# Patient Record
Sex: MALE | NOT HISPANIC OR LATINO | Employment: FULL TIME | URBAN - METROPOLITAN AREA
[De-identification: names, ages, dates, MRNs, and addresses within clinical notes are randomized per-mention and may not be internally consistent; named-entity substitution may affect disease eponyms.]

---

## 2023-09-11 ENCOUNTER — HOSPITAL ENCOUNTER (OUTPATIENT)
Dept: DATA CONVERSION | Facility: HOSPITAL | Age: 63
Discharge: HOME | End: 2023-09-11
Payer: COMMERCIAL

## 2023-09-11 DIAGNOSIS — Z88.0 ALLERGY STATUS TO PENICILLIN: ICD-10-CM

## 2023-09-11 DIAGNOSIS — M25.512 PAIN IN LEFT SHOULDER: ICD-10-CM

## 2023-09-11 DIAGNOSIS — S49.92XA UNSPECIFIED INJURY OF LEFT SHOULDER AND UPPER ARM, INITIAL ENCOUNTER: ICD-10-CM
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
## 2024-05-29 ENCOUNTER — OFFICE VISIT (OUTPATIENT)
Dept: PRIMARY CARE | Facility: CLINIC | Age: 64
End: 2024-05-29
Payer: COMMERCIAL

## 2024-05-29 VITALS
HEIGHT: 67 IN | BODY MASS INDEX: 18.15 KG/M2 | OXYGEN SATURATION: 97 % | WEIGHT: 115.6 LBS | TEMPERATURE: 97.3 F | DIASTOLIC BLOOD PRESSURE: 82 MMHG | SYSTOLIC BLOOD PRESSURE: 130 MMHG | HEART RATE: 91 BPM

## 2024-05-29 DIAGNOSIS — M54.12 CERVICAL RADICULOPATHY: ICD-10-CM

## 2024-05-29 DIAGNOSIS — Z13.0 SCREENING FOR DEFICIENCY ANEMIA: ICD-10-CM

## 2024-05-29 DIAGNOSIS — R22.0 MASS OF LEFT SUBMANDIBULAR REGION: Primary | ICD-10-CM

## 2024-05-29 DIAGNOSIS — Z13.228 SCREENING FOR METABOLIC DISORDER: ICD-10-CM

## 2024-05-29 DIAGNOSIS — R20.2 ARM PARESTHESIA, LEFT: ICD-10-CM

## 2024-05-29 PROCEDURE — 4004F PT TOBACCO SCREEN RCVD TLK: CPT | Performed by: FAMILY MEDICINE

## 2024-05-29 PROCEDURE — 99204 OFFICE O/P NEW MOD 45 MIN: CPT | Performed by: FAMILY MEDICINE

## 2024-05-29 RX ORDER — LANOLIN ALCOHOL/MO/W.PET/CERES
1000 CREAM (GRAM) TOPICAL
COMMUNITY

## 2024-05-29 RX ORDER — CHOLECALCIFEROL (VITAMIN D3) 50 MCG
TABLET ORAL
COMMUNITY

## 2024-05-29 RX ORDER — THIAMINE HCL 50 MG
50 TABLET ORAL DAILY
COMMUNITY

## 2024-05-29 ASSESSMENT — PATIENT HEALTH QUESTIONNAIRE - PHQ9
1. LITTLE INTEREST OR PLEASURE IN DOING THINGS: NOT AT ALL
SUM OF ALL RESPONSES TO PHQ9 QUESTIONS 1 AND 2: 0
2. FEELING DOWN, DEPRESSED OR HOPELESS: NOT AT ALL

## 2024-05-29 ASSESSMENT — PAIN SCALES - GENERAL: PAINLEVEL: 0-NO PAIN

## 2024-05-29 NOTE — ASSESSMENT & PLAN NOTE
- Previous CT imaging reviewed with palpable mass on physical  -Will continue with previously recommended MRI; please schedule at earliest convenience  -Will additionally coordinate for formal ENT consultation

## 2024-05-29 NOTE — PATIENT INSTRUCTIONS
Problem List Items Addressed This Visit    None      Counseling:       Medication education:         Education:  The patient is counseled regarding potential side-effects of all new medications        Understanding:  Patient expressed understanding        Adherence:  No barriers to adherence identified    ** Please excuse any errors in grammar or translation related to this dictation. Voice recognition software was utilized to prepare this document. **

## 2024-05-29 NOTE — ASSESSMENT & PLAN NOTE
- Will utilize MRI to check for any nerve impingement as well as blood work to see if there are any contributing deficiencies or metabolic abnormalities

## 2024-05-29 NOTE — PROGRESS NOTES
Outpatient Visit Note    Chief Complaint   Patient presents with    New Patient Visit     Lump on left side of throat that was noted on a CT scan in Dec. Pt unsure how long lump has been present. He thought it was his tonsil but it is only present on the left side. Radiology referred pt to see a PCP.         HPI:  Grabiel Duval is a 64 y.o. male who presents to the office as a new patient    Review of chart notes that patient was evaluated at [facility] emergency department in December 2023 following a fall.  Chart notes that patient had fallen the night prior onto a concrete floor due to alcohol intoxication.  He sustained contusions and swelling of his right face wrist.  Imaging was completed with patient started on IV antibiotics and IV Keppra neurosurgery and ophthalmology were consulted as patient sustained a right frontal parenchymal contusion with right frontal subdural hematoma without mass effect.  Patient also had fractures including the roof of the right orbit with extension into the right frontal bone, with right orbital floor and lateral wall of the right maxillary sinus and right zygoma maxillary arch.  Patient additionally had closed compaction distal radius and ulnar styloid fractures.  In interval, patient has been extensively followed by [facility] Orth[facility] surgery and OT.      During imaging, CT of cervical spine did appreciate a cystic lesion/mass in the left submandibular region.  Further evaluation with MRI was recommended but patient has not had any follow-up in regards to this issue since.  He does continue to have a palpable, nontender lump with no reported swallowing difficulties.  Does express interest in having follow through on this.  Separately, he does report that over the course last week he has had recurrent episodes of paresthesias in his left upper extremity.  Denies any injury or activity change prior to symptom onset.    States to be otherwise in good health other than recurrent  aggravation in his right wrist status post injury/fracture to which he continues to work with physical therapy.    Current Medications  Current Outpatient Medications   Medication Instructions    ASCORBIC ACID, VITAMIN C, ORAL oral    CALCIUM-MAGNESIUM-ZINC ORAL oral    cholecalciferol (Vitamin D3) 50 MCG (2000 UT) tablet oral    cyanocobalamin (VITAMIN B-12) 1,000 mcg, oral, Daily RT    thiamine (VITAMIN B-1) 50 mg, oral, Daily        Allergies  No Known Allergies     History reviewed. No pertinent past medical history.   History reviewed. No pertinent surgical history.  Family History   Problem Relation Name Age of Onset    Cancer Father      Liver cancer Paternal Grandfather       Social History     Tobacco Use    Smoking status: Every Day     Current packs/day: 0.25     Types: Cigarettes    Smokeless tobacco: Never   Vaping Use    Vaping status: Never Used   Substance Use Topics    Alcohol use: Yes     Alcohol/week: 1.0 - 2.0 standard drink of alcohol     Types: 1 - 2 Glasses of wine per week     Comment: every other day    Drug use: Never       ROS  All pertinent positive symptoms are included in the history of present illness.  All other systems have been reviewed and are negative and noncontributory to this patient's current ailments.    VITAL SIGNS  Vitals:    05/29/24 1322   BP: 130/82   Pulse: 91   Temp: 36.3 °C (97.3 °F)   SpO2: 97%       PHYSICAL EXAM  GENERAL APPEARANCE: alert and oriented, Pleasant and cooperative, No Acute Distress  HEENT: EOMI, PERRLA, MMM  NECK: Palpable left nontender submandibular nodule; negative Spurling's test  HEART: RRR, normal S1S2, no murmurs, click or rubs  LUNGS: clear to auscultation bilaterally, no wheezes/rhonchi/rales  EXTREMITIES: no edema, normal ROM  SKIN: normal, no rash, unremarkable  NEUROLOGIC EXAM: non-focal exam  MUSCULOSKELETAL: no gross abnormalities  PSYCH: affect is normal, eye contact is good    Assessment/Plan   Problem List Items Addressed This Visit              ICD-10-CM    Mass of left submandibular region - Primary R22.0     - Previous CT imaging reviewed with palpable mass on physical  -Will continue with previously recommended MRI; please schedule at earliest convenience  -Will additionally coordinate for formal ENT consultation         Relevant Orders    MR cervical spine w and wo IV contrast    Referral to ENT    Cervical radiculopathy M54.12     - Will utilize MRI to check for any nerve impingement as well as blood work to see if there are any contributing deficiencies or metabolic abnormalities         Relevant Orders    MR cervical spine w and wo IV contrast     Other Visit Diagnoses         Codes    Arm paresthesia, left     R20.2    Relevant Orders    TSH with reflex to Free T4 if abnormal    Comprehensive Metabolic Panel    Screening for deficiency anemia     Z13.0    Relevant Orders    CBC    Screening for metabolic disorder     Z13.228    Relevant Orders    TSH with reflex to Free T4 if abnormal    Comprehensive Metabolic Panel            Counseling:       Medication education:         Education:  The patient is counseled regarding potential side-effects of all new medications        Understanding:  Patient expressed understanding        Adherence:  No barriers to adherence identified    ** Please excuse any errors in grammar or translation related to this dictation. Voice recognition software was utilized to prepare this document. **

## 2024-05-31 ENCOUNTER — TELEPHONE (OUTPATIENT)
Dept: PRIMARY CARE | Facility: CLINIC | Age: 64
End: 2024-05-31
Payer: COMMERCIAL

## 2024-05-31 DIAGNOSIS — R22.0 MASS OF LEFT SUBMANDIBULAR REGION: Primary | ICD-10-CM

## 2024-05-31 NOTE — TELEPHONE ENCOUNTER
Pt is scheduled to have a procedure on June 8th , pre cert was denied. In need of peer to peer to try to overturn. [facility] will be emailing the peer to peer form that pcp will need to complete and fax back.

## 2024-05-31 NOTE — LETTER
"May 31, 2024     Grabiel Duval  [address] [place] [address]    Patient: Grabiel Duval   YOB: 1960   Date of Visit: 5/31/2024       To whom it may concern,    My hope is that this letter will reach the appropriate parties as I did attempt to call and schedule a \"peer to peer\" just to be silently disconnected with any individual as they \"scheduled\" the requirement to justify why this order was made.  Please see the attached documentation.  It is clear that when insurance initially denied this they focused on the secondary diagnosis of cervical radiculopathy citing inadequate therapeutic options attempted prior to securing MRI request.  The primary request for the MRI was the submandibular mass which was identified via CT scan at the [facility] in December 2021 at which time radiology directly recommended follow-up imaging with MRI.  Please see report attached. at this time, patient currently has images scheduled to be completed on 6/6/2024.    I kindly ask that the oreilly that be properly evaluate the situation.  In the meantime, I will have the patient different getting imaging and will place a ENT referral with hopes that they may be able to satisfy the criteria that you have sent to determine how we provide care to our patients      Regards,    Zackery Li D.O.    "

## 2024-05-31 NOTE — TELEPHONE ENCOUNTER
Attempted to call and schedule though was disconnected.  Department stated that I would have to schedule peer to peer with the soonest available being Tuesday which is only 48 hours before patient schedule appointment.  They do not seem to be operating off of the correct information as a site the patient did not have trial of anti-inflammatory or physical therapy prior to MRI request.  Primary reason was to assess submandibular mass as ordered though they seem to only emphasize on the additional diagnosis of cervical radiculopathy.  I do not feel that I will be able to appropriately accommodate their wishes to justify why this individual should get this imaging in time for his current scheduled appointment.  I would have him defer so he does not incur significant cost.  In the meantime I will place a ENT referral with hopes that this will allow for proper management if his insurance is willing to cooperate in the future

## 2024-06-08 ENCOUNTER — APPOINTMENT (OUTPATIENT)
Dept: RADIOLOGY | Facility: HOSPITAL | Age: 64
End: 2024-06-08
Payer: COMMERCIAL

## 2024-06-12 ENCOUNTER — TELEPHONE (OUTPATIENT)
Dept: PRIMARY CARE | Facility: CLINIC | Age: 64
End: 2024-06-12
Payer: COMMERCIAL

## 2024-06-12 DIAGNOSIS — R22.0 MASS OF LEFT SUBMANDIBULAR REGION: Primary | ICD-10-CM

## 2024-06-12 NOTE — TELEPHONE ENCOUNTER
Grabiel called as he received a letter from [facility] that his MRI is being denied, as the reason being he needs to complete PT prior to having MRI done. Pt was under the impression that he did not need to complete PT in order to having the MRI done. Please advise and call back thank you.

## 2024-06-13 NOTE — TELEPHONE ENCOUNTER
Please refer to previous message on 5/31/2024.  I did attempt to communicate with insurance as they were denying his MRI.  Part of this was because I included his neck issues as an additional reason for completing imaging.  With this, they have denied the MRI viewing it as a musculoskeletal complaints which they are requesting physical therapy assessment.  I did have paperwork sent reiterating the previous imaging completed at ER including CT recommending MRI for soft tissue mass.  At this time, our best route is likely to link up with ear nose and throat specialist who can confirm that MRI is the most appropriate imaging type and likely get this approved via their specialty status

## 2024-06-20 ENCOUNTER — APPOINTMENT (OUTPATIENT)
Dept: OTOLARYNGOLOGY | Facility: HOSPITAL | Age: 64
End: 2024-06-20
Payer: COMMERCIAL

## 2024-07-02 ENCOUNTER — APPOINTMENT (OUTPATIENT)
Dept: OTOLARYNGOLOGY | Facility: CLINIC | Age: 64
End: 2024-07-02
Payer: COMMERCIAL

## 2024-07-22 ASSESSMENT — ENCOUNTER SYMPTOMS
NEAR-SYNCOPE: 0
LIGHT-HEADEDNESS: 0
WEAKNESS: 0
VERTIGO: 0
SLURRED SPEECH: 0
DIAPHORESIS: 0
MEMORY LOSS: 0
LOSS OF BALANCE: 0
PALPITATIONS: 0
ALTERED MENTAL STATUS: 0
NAUSEA: 0
FEVER: 0
BACK PAIN: 0
FOCAL WEAKNESS: 0
VISUAL CHANGE: 0
CONFUSION: 0
AURA: 0
ABDOMINAL PAIN: 0
FOCAL SENSORY LOSS: 1
HEADACHES: 0
BOWEL INCONTINENCE: 0
FATIGUE: 0
CLUMSINESS: 0
NECK PAIN: 0
SHORTNESS OF BREATH: 0
DIZZINESS: 0
VOMITING: 0
NEUROLOGIC COMPLAINT: 1

## 2024-07-23 ENCOUNTER — APPOINTMENT (OUTPATIENT)
Dept: OTOLARYNGOLOGY | Facility: CLINIC | Age: 64
End: 2024-07-23
Payer: COMMERCIAL

## 2024-07-23 VITALS — BODY MASS INDEX: 17.99 KG/M2 | HEIGHT: 67 IN | WEIGHT: 114.6 LBS | TEMPERATURE: 98.6 F

## 2024-07-23 DIAGNOSIS — R22.0 MASS OF LEFT SUBMANDIBULAR REGION: ICD-10-CM

## 2024-07-23 PROCEDURE — 3008F BODY MASS INDEX DOCD: CPT | Performed by: OTOLARYNGOLOGY

## 2024-07-23 PROCEDURE — 99204 OFFICE O/P NEW MOD 45 MIN: CPT | Performed by: OTOLARYNGOLOGY

## 2024-07-23 ASSESSMENT — PATIENT HEALTH QUESTIONNAIRE - PHQ9
SUM OF ALL RESPONSES TO PHQ9 QUESTIONS 1 AND 2: 0
2. FEELING DOWN, DEPRESSED OR HOPELESS: NOT AT ALL
1. LITTLE INTEREST OR PLEASURE IN DOING THINGS: NOT AT ALL

## 2024-07-23 NOTE — PROGRESS NOTES
CC: lump in neck    Consulted by: dr hardy    HPI:  noticed left sided neck mass    Felt it on his own since he was 20 in the [employer]    Didn't pay any attention    Thought it was his tonsil    Doesn't bother him        Past medical history:no HTN, no DM    Past surgical history: right pink finger 2004/set    Social history:  stopped 2.5 weeks (+ smoking prior for 35 yrs r< 1 ppd) , occasional  drinking    Family history:  Reviewed and not relevant to the presenting complaint    Current medications:      Reviewed as noted in current orders     Allergies:                               Reviewed and as noted in current orders    ROS:  All other systems have been reviewed and are negative for complaint. I personally reviewed the intake form that was scanned in today    PE:  CONSTITUTIONAL:  Vitals  -reviewed from intake field, well developed, well nourished.    VOICE:    RESPIRATION:  Breathing comfortably, no stridor.  CV:  No clubbing/cyanosis/edema in hands.  EYES:  EOM Intact, sclera normal.  NEURO:  Alert and oriented times 3, Cranial nerves II-XII intact and symmetric bilaterally.  HEAD AND FACE:  Symmetric facial features,  no masses or lesions, sinuses nontender to palpation.  SALIVARY GLANDS:  Parotid and submandibular glands normal on right, left SMG fullness  EARS:  Normal external ears, external auditory canals, and TMs to otoscopy, normal hearing to whispered voice.  NOSE:  External nose midline, anterior rhinoscopy is normal with limited visualization to the anterior aspect of the inferior turbinates.  No lesions noted.    ORAL CAVITY/OROPHARYNX/LIPS:  Normal mucous membranes, normal floor of mouth/tongue/OP, no masses or lesions are noted.  PHARYNGEAL WALLS AND NASOPHARYNX:  No masses noted. Mucosa appears clean and moist  NECK/LYMPH:  No LAD, no thyroid masses. Trachea palpably midline  SKIN:  Neck skin is without scar or injury  PSYCH:  Alert and oriented with appropriate mood and affect      A/P:     left neck fullness    MRI to best evaluate the soft tissues of the region given the location      Differential diagnosis reviewed  50% malignancy statistically in this region although given the duration makes this less likely    However there are also those that have malignancy transformations

## 2024-08-06 ENCOUNTER — HOSPITAL ENCOUNTER (OUTPATIENT)
Dept: RADIOLOGY | Facility: HOSPITAL | Age: 64
Discharge: HOME | End: 2024-08-06
Payer: COMMERCIAL

## 2024-08-06 DIAGNOSIS — R22.0 MASS OF LEFT SUBMANDIBULAR REGION: ICD-10-CM

## 2024-08-06 PROCEDURE — A9575 INJ GADOTERATE MEGLUMI 0.1ML: HCPCS | Performed by: OTOLARYNGOLOGY

## 2024-08-06 PROCEDURE — 2550000001 HC RX 255 CONTRASTS: Performed by: OTOLARYNGOLOGY

## 2024-08-06 PROCEDURE — 70543 MRI ORBT/FAC/NCK W/O &W/DYE: CPT | Performed by: RADIOLOGY

## 2024-08-06 PROCEDURE — 70543 MRI ORBT/FAC/NCK W/O &W/DYE: CPT

## 2024-08-06 RX ORDER — GADOTERATE MEGLUMINE 376.9 MG/ML
11 INJECTION INTRAVENOUS
Status: COMPLETED | OUTPATIENT
Start: 2024-08-06 | End: 2024-08-06

## 2024-08-13 ENCOUNTER — TELEPHONE (OUTPATIENT)
Dept: OTOLARYNGOLOGY | Facility: HOSPITAL | Age: 64
End: 2024-08-13
Payer: COMMERCIAL

## 2024-08-13 NOTE — TELEPHONE ENCOUNTER
Called patient to discuss MRI results.  Voicemail not activated.  Unable to leave message.    Areli please reach him for an appointment or for a time for he and I did speak    Would need to bring him back to the office for a needle aspiration   Happy to talk to him about it

## 2024-08-20 ENCOUNTER — APPOINTMENT (OUTPATIENT)
Dept: OTOLARYNGOLOGY | Facility: CLINIC | Age: 64
End: 2024-08-20
Payer: COMMERCIAL

## 2024-08-20 VITALS — HEIGHT: 67 IN | WEIGHT: 113.5 LBS | TEMPERATURE: 96.7 F | BODY MASS INDEX: 17.81 KG/M2

## 2024-08-20 DIAGNOSIS — R22.0 MASS OF LEFT SUBMANDIBULAR REGION: ICD-10-CM

## 2024-08-20 PROCEDURE — 88305 TISSUE EXAM BY PATHOLOGIST: CPT

## 2024-08-20 PROCEDURE — 99214 OFFICE O/P EST MOD 30 MIN: CPT | Performed by: OTOLARYNGOLOGY

## 2024-08-20 PROCEDURE — 88173 CYTOPATH EVAL FNA REPORT: CPT

## 2024-08-20 PROCEDURE — 10021 FNA BX W/O IMG GDN 1ST LES: CPT | Performed by: OTOLARYNGOLOGY

## 2024-08-20 RX ORDER — MULTIVITAMIN/IRON/FOLIC ACID 18MG-0.4MG
1 TABLET ORAL DAILY
COMMUNITY

## 2024-08-20 RX ORDER — LIDOCAINE HYDROCHLORIDE AND EPINEPHRINE 10; 10 MG/ML; UG/ML
3 INJECTION, SOLUTION INFILTRATION; PERINEURAL ONCE
Status: COMPLETED | OUTPATIENT
Start: 2024-08-20 | End: 2024-08-20

## 2024-08-20 ASSESSMENT — PATIENT HEALTH QUESTIONNAIRE - PHQ9
2. FEELING DOWN, DEPRESSED OR HOPELESS: NOT AT ALL
1. LITTLE INTEREST OR PLEASURE IN DOING THINGS: NOT AT ALL
SUM OF ALL RESPONSES TO PHQ9 QUESTIONS 1 AND 2: 0

## 2024-08-20 NOTE — PROGRESS NOTES
Patient here for follow-up on his longstanding left neck mass that perhaps has begun to grow recently    There is no pain    We reviewed the MRI directly on the PACS system showing what appears to be a well-circumscribed enhancing mass in the left submandibular region    There is some irregularity at one of the edges and some central lucency    Overall has not picture consistent with a benign process but given the recent change he understands there may be a consideration for malignant degeneration which happens in a small percentage of these over time        After discussion he elected to move forward with needle aspiration      Physical Exam:  CONSTITUTIONAL:  No acute distress  VOICE:  No hoarseness or other abnormality  RESPIRATION:  Breathing comfortably, no stridor  CV:  No clubbing/cyanosis/edema in hands  EYES:  EOM intact, sclera normal  NEURO:  Alert and oriented times 3, Cranial nerves II-XII grossly intact and symmetric bilaterally  HEAD AND FACE:  Symmetric facial features, no masses or lesions, sinuses non-tender to palpation  SALIVARY GLANDS:  Parotid and submandibular glands normal bilaterally except left submandibular gland which has a 3 cm mobile mass  EARS:  Normal external ears, external auditory canals, and TMs to otoscopy, normal hearing to whispered voice.  NOSE:  External nose midline, anterior rhinoscopy is normal with limited visualization to the anterior aspect of the interior turbinates, no bleeding or drainage, no lesions  ORAL CAVITY/OROPHARYNX/LIPS:  Normal mucous membranes, normal floor of mouth/tongue/OP, no masses or lesions  PHARYNGEAL WALLS:  No masses or lesions  NECK/LYMPH:  No LAD, no thyroid masses, trachea midline  SKIN:  Neck skin is without scar or injury  PSYCH:  Alert and oriented with appropriate mood and affect        At informed verbal consent the lesion and mass in question was appropriately identified cleansed and subcutaneous anesthetized 1% lidocaine with one  100,000 units of epinephrine and subsequent fine-needle aspiration utilizing 22-gauge needle was performed under sterile condition ×3 with slides, CytoLyt appropriate being obtained created labeled and sent to pathology for analysis pressure was applied for hemostasis area was dressed wound care instructions provided patient tolerated procedure well         Imp left SMG mass      Have talked about the differential diagnosis and the malignant potential of these mass either now or in the future      Detailed discussion regarding surgical management has been discussed    He wishes to proceed and we will work on the details as far as time off work etc.      Risk benefits and alternatives and lengthy discussion was held with patient regarding removal of the submandibular gland including surgical incisions, bleeding infection, risks to the marginal mandibular nerve, lingual nerve as well as hypoglossal nerve and the various implications of each.    Expectations from reduction and saliva and potential transient and in some cases long-term reduction saliva reviewed with them.        Transorally versus transcervical versus hybrid excisions have been reviewed    Verbal consent has been obtained

## 2024-08-21 LAB
LABORATORY COMMENT REPORT: NORMAL
LABORATORY COMMENT REPORT: NORMAL
PATH REPORT.COMMENTS IMP SPEC: NORMAL
PATH REPORT.FINAL DX SPEC: NORMAL
PATH REPORT.GROSS SPEC: NORMAL
PATH REPORT.RELEVANT HX SPEC: NORMAL
PATH REPORT.TOTAL CANCER: NORMAL
RESIDENT REVIEW: NORMAL

## 2024-08-22 ENCOUNTER — TELEPHONE (OUTPATIENT)
Dept: OTOLARYNGOLOGY | Facility: HOSPITAL | Age: 64
End: 2024-08-22
Payer: COMMERCIAL

## 2024-09-05 ENCOUNTER — TELEPHONE (OUTPATIENT)
Dept: OTOLARYNGOLOGY | Facility: HOSPITAL | Age: 64
End: 2024-09-05
Payer: COMMERCIAL

## 2024-09-05 NOTE — TELEPHONE ENCOUNTER
Spoke with patient about his biopsy report    Discussed the concept of pleomorphic adenoma and the potential for future malignant transformation    He would like to have it removed but would like to wait until after the first of the year  I will see him in December and we will plan for surgery  In early 2025

## 2024-12-03 ENCOUNTER — APPOINTMENT (OUTPATIENT)
Dept: OTOLARYNGOLOGY | Facility: CLINIC | Age: 64
End: 2024-12-03
Payer: COMMERCIAL

## 2024-12-03 VITALS — BODY MASS INDEX: 18.52 KG/M2 | TEMPERATURE: 98.2 F | WEIGHT: 118 LBS | HEIGHT: 67 IN

## 2024-12-03 DIAGNOSIS — R22.0 MASS OF LEFT SUBMANDIBULAR REGION: Primary | ICD-10-CM

## 2024-12-03 PROCEDURE — 99214 OFFICE O/P EST MOD 30 MIN: CPT | Performed by: OTOLARYNGOLOGY

## 2024-12-03 PROCEDURE — 3008F BODY MASS INDEX DOCD: CPT | Performed by: OTOLARYNGOLOGY

## 2024-12-05 NOTE — PROGRESS NOTES
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    Planning for removal in early 2025 per his request secondary to work constraints    This has been a longstanding mass    He understands the concept of potential malignant degeneration in the future and increased complexity with growth and increased size or with malignant change      Physical Exam:  CONSTITUTIONAL:  No acute distress  VOICE:  No hoarseness or other abnormality  RESPIRATION:  Breathing comfortably, no stridor  CV:  No clubbing/cyanosis/edema in hands  EYES:  EOM intact, sclera normal  NEURO:  Alert and oriented times 3, Cranial nerves II-XII grossly intact and symmetric bilaterally with the exception of left submandibular gland which has a firm mobile mass  HEAD AND FACE:  Symmetric facial features, no masses or lesions, sinuses non-tender to palpation  SALIVARY GLANDS:  Parotid and submandibular glands normal bilaterally  EARS:  Normal external ears, external auditory canals, and TMs to otoscopy, normal hearing to whispered voice.  NOSE:  External nose midline, anterior rhinoscopy is normal with limited visualization to the anterior aspect of the interior turbinates, no bleeding or drainage, no lesions  ORAL CAVITY/OROPHARYNX/LIPS:  Normal mucous membranes, normal floor of mouth/tongue/OP, no masses or lesions  PHARYNGEAL WALLS:  No masses or lesions  NECK/LYMPH:  No LAD, no thyroid masses, trachea midline  SKIN:  Neck skin is without scar or injury  PSYCH:  Alert and oriented with appropriate mood and affect  Scans been reviewed showing mass in the left submandibular region        Plans are for excision    Verbal consent has been obtained        Risk benefits and alternatives and lengthy discussion was held with patient regarding removal of the submandibular gland including surgical incisions, bleeding infection, risks to the marginal mandibular nerve, lingual nerve as well as  hypoglossal nerve and the various implications of each.    Expectations from reduction and saliva and potential transient and in some cases long-term reduction saliva reviewed with them.    Other options including observation  and the pros and cons of each approach were reviewed.        Verbal consent has been obtained

## 2024-12-06 DIAGNOSIS — R22.1 NECK MASS: ICD-10-CM

## 2024-12-06 NOTE — PROGRESS NOTES
Spoke to patient and he would like to proceed with surgery on 1/23. Surgery information emailed/mailed to patient

## 2025-01-07 ENCOUNTER — CLINICAL SUPPORT (OUTPATIENT)
Dept: PREADMISSION TESTING | Facility: HOSPITAL | Age: 65
End: 2025-01-07
Payer: COMMERCIAL

## 2025-01-07 DIAGNOSIS — R22.1 NECK MASS: ICD-10-CM

## 2025-01-07 RX ORDER — ZINC GLUCONATE 50 MG
TABLET ORAL DAILY
COMMUNITY
End: 2025-01-14 | Stop reason: WASHOUT

## 2025-01-07 NOTE — CPM/PAT NURSE NOTE
CPM/PAT Nurse Note      Name: Grabiel Duval (Grabiel Duval)  /Age: 1960/64 y.o.       No past medical history on file.    No past surgical history on file.    Patient  has no history on file for sexual activity.    Family History   Problem Relation Name Age of Onset    Cancer Father      Liver cancer Paternal Grandfather         No Known Allergies    Prior to Admission medications    Medication Sig Start Date End Date Taking? Authorizing Provider   ASCORBIC ACID, VITAMIN C, ORAL Take by mouth.   Yes Historical Provider, MD   b complex 0.4 mg tablet Take 1 tablet by mouth once daily.   Yes Historical Provider, MD   CALCIUM-MAGNESIUM-ZINC ORAL Take by mouth.   Yes Historical Provider, MD   cholecalciferol (Vitamin D3) 50 MCG (2000 UT) tablet Take by mouth.   Yes Historical Provider, MD   cyanocobalamin (Vitamin B-12) 1,000 mcg tablet Take 1 tablet (1,000 mcg) by mouth once daily.   Yes Historical Provider, MD   thiamine (Vitamin B-1) 50 mg tablet Take 1 tablet (50 mg) by mouth once daily.   Yes Historical Provider, MD   B complex-vitamin C-folic acid (Nephro-Delia Rx) 1- mg-mg-mcg tablet Take 1 tablet by mouth once daily with breakfast.    Historical Provider, MD   cetirizine HCl (ALLERGY RELIEF, CETIRIZINE, ORAL) Take by mouth if needed.    Historical Provider, MD   GINSENG ORAL Take by mouth if needed.    Historical Provider, MD   POTASSIUM CHLORIDE, BULK, MISC if needed.    Historical Provider, MD   TURMERIC ORAL Take by mouth if needed.    Historical Provider, MD   zinc gluconate 50 mg tablet Take by mouth once daily.    Historical Provider, MD DIETZ ROS     DASI Risk Score    No data to display       Caprini DVT Assessment    No data to display       Modified Frailty Index    No data to display       CHADS2 Stroke Risk  Current as of 3 days ago        N/A 3 to 100%: High Risk   2 to < 3%: Medium Risk   0 to < 2%: Low Risk     Last Change: N/A          This score determines the patient's risk  of having a stroke if the patient has atrial fibrillation.        This score is not applicable to this patient. Components are not calculated.          Revised Cardiac Risk Index    No data to display       Apfel Simplified Score    No data to display       Risk Analysis Index Results This Encounter    No data found in the last 10 encounters.       Prodigy: High Risk  Total Score: 16              Prodigy Age Score      Prodigy Gender Score          ARISCAT Score for Postoperative Pulmonary Complications    No data to display       Mcnally Perioperative Risk for Myocardial Infarction or Cardiac Arrest (MACI)    No data to display         Nurse Plan of Action:       RN screening call complete.  Reviewed allergies, medications and pharmacy.

## 2025-01-14 ENCOUNTER — PRE-ADMISSION TESTING (OUTPATIENT)
Dept: PREADMISSION TESTING | Facility: HOSPITAL | Age: 65
End: 2025-01-14
Payer: COMMERCIAL

## 2025-01-14 ENCOUNTER — DOCUMENTATION (OUTPATIENT)
Dept: PREADMISSION TESTING | Facility: HOSPITAL | Age: 65
End: 2025-01-14

## 2025-01-14 ENCOUNTER — LAB (OUTPATIENT)
Dept: LAB | Facility: LAB | Age: 65
End: 2025-01-14
Payer: COMMERCIAL

## 2025-01-14 VITALS
RESPIRATION RATE: 18 BRPM | BODY MASS INDEX: 18 KG/M2 | DIASTOLIC BLOOD PRESSURE: 85 MMHG | OXYGEN SATURATION: 99 % | HEIGHT: 65 IN | WEIGHT: 108.03 LBS | TEMPERATURE: 98.5 F | SYSTOLIC BLOOD PRESSURE: 147 MMHG | HEART RATE: 91 BPM

## 2025-01-14 DIAGNOSIS — Z01.818 PREOP TESTING: ICD-10-CM

## 2025-01-14 DIAGNOSIS — Z01.818 PREOP TESTING: Primary | ICD-10-CM

## 2025-01-14 LAB
ANION GAP SERPL CALC-SCNC: 14 MMOL/L (ref 10–20)
BASOPHILS # BLD AUTO: 0.02 X10*3/UL (ref 0–0.1)
BASOPHILS NFR BLD AUTO: 0.4 %
BUN SERPL-MCNC: 7 MG/DL (ref 6–23)
CALCIUM SERPL-MCNC: 10.9 MG/DL (ref 8.6–10.3)
CHLORIDE SERPL-SCNC: 103 MMOL/L (ref 98–107)
CO2 SERPL-SCNC: 27 MMOL/L (ref 21–32)
CREAT SERPL-MCNC: 0.84 MG/DL (ref 0.5–1.3)
EGFRCR SERPLBLD CKD-EPI 2021: >90 ML/MIN/1.73M*2
EOSINOPHIL # BLD AUTO: 0.02 X10*3/UL (ref 0–0.7)
EOSINOPHIL NFR BLD AUTO: 0.4 %
ERYTHROCYTE [DISTWIDTH] IN BLOOD BY AUTOMATED COUNT: 12.6 % (ref 11.5–14.5)
GLUCOSE SERPL-MCNC: 86 MG/DL (ref 74–99)
HCT VFR BLD AUTO: 39.5 % (ref 41–52)
HGB BLD-MCNC: 13 G/DL (ref 13.5–17.5)
IMM GRANULOCYTES # BLD AUTO: 0.03 X10*3/UL (ref 0–0.7)
IMM GRANULOCYTES NFR BLD AUTO: 0.7 % (ref 0–0.9)
LYMPHOCYTES # BLD AUTO: 0.67 X10*3/UL (ref 1.2–4.8)
LYMPHOCYTES NFR BLD AUTO: 14.8 %
MCH RBC QN AUTO: 32 PG (ref 26–34)
MCHC RBC AUTO-ENTMCNC: 32.9 G/DL (ref 32–36)
MCV RBC AUTO: 97 FL (ref 80–100)
MONOCYTES # BLD AUTO: 0.68 X10*3/UL (ref 0.1–1)
MONOCYTES NFR BLD AUTO: 15 %
NEUTROPHILS # BLD AUTO: 3.11 X10*3/UL (ref 1.2–7.7)
NEUTROPHILS NFR BLD AUTO: 68.7 %
NRBC BLD-RTO: 0 /100 WBCS (ref 0–0)
PLATELET # BLD AUTO: 123 X10*3/UL (ref 150–450)
POTASSIUM SERPL-SCNC: 4.3 MMOL/L (ref 3.5–5.3)
RBC # BLD AUTO: 4.06 X10*6/UL (ref 4.5–5.9)
SODIUM SERPL-SCNC: 140 MMOL/L (ref 136–145)
WBC # BLD AUTO: 4.5 X10*3/UL (ref 4.4–11.3)

## 2025-01-14 PROCEDURE — 99204 OFFICE O/P NEW MOD 45 MIN: CPT | Performed by: NURSE PRACTITIONER

## 2025-01-14 PROCEDURE — 80048 BASIC METABOLIC PNL TOTAL CA: CPT

## 2025-01-14 PROCEDURE — 85025 COMPLETE CBC W/AUTO DIFF WBC: CPT

## 2025-01-14 ASSESSMENT — ENCOUNTER SYMPTOMS
NECK NEGATIVE: 1
ENDOCRINE NEGATIVE: 1
NEUROLOGICAL NEGATIVE: 1
DIFFICULTY URINATING: 0
RESPIRATORY NEGATIVE: 1
CARDIOVASCULAR NEGATIVE: 1
GASTROINTESTINAL NEGATIVE: 1
CONSTITUTIONAL NEGATIVE: 1
MUSCULOSKELETAL NEGATIVE: 1

## 2025-01-14 NOTE — H&P (VIEW-ONLY)
[facility]/PAT Evaluation       Name: Grabiel Duval (Grabiel Duval)  /Age: 1960/64 y.o.     In-Person         Date of Consult: 25    Referring Provider:  Dr. Ruiz    Date, Surgery, and Length:  25, left salivary gland excision, 120 minutes    Patient presents to [facility] for perioperative risk assessment prior to scheduled surgery. Patient presents with pleomorphic adenoma of left submandibular gland. He would like to proceed with removal.      This note was created in part upon personal review of patient's medical records.      Pt denies any past history of anesthetic complications such as PONV, awareness, prolonged sedation, dental damage, aspiration, cardiac arrest, difficult intubation, difficult I.V. access or unexpected hospital admissions. No history of malignant hyperthermia and or pseudocholinesterase deficiency.    No history of blood transfusions.    The patient IS NOT a [employer] and will accept blood and blood products if medically indicated.     Type and screen NOT sent.      Past Medical History:   Diagnosis Date    Cervical radiculopathy     Mass of left submandibular region     Right orbital fracture (Multi)         Right wrist fracture          No past surgical history on file.    Family History   Problem Relation Name Age of Onset    Cancer Father      Liver cancer Paternal Grandfather       Social History     Tobacco Use   Smoking Status Every Day    Current packs/day: 0.50    Types: Cigarettes    Passive exposure: Never   Smokeless Tobacco Never     Quit smoking 3 weeks ago     Social History     Substance and Sexual Activity   Alcohol Use Yes    Alcohol/week: 1.0 - 2.0 standard drink of alcohol    Types: 1 - 2 Glasses of wine per week    Comment: every other day     Social History     Substance and Sexual Activity   Drug Use Never       No Known Allergies    Current Outpatient Medications   Medication Instructions    ASCORBIC ACID, VITAMIN C, ORAL Take by  "mouth.    b complex 0.4 mg tablet 1 tablet, Daily    B complex-vitamin C-folic acid (Nephro-Delia Rx) 1- mg-mg-mcg tablet 1 tablet, Daily with breakfast    CALCIUM-MAGNESIUM-ZINC ORAL Take by mouth.    cetirizine HCl (ALLERGY RELIEF, CETIRIZINE, ORAL) As needed    cholecalciferol (Vitamin D3) 50 MCG (2000 UT) tablet Take by mouth.    cyanocobalamin (VITAMIN B-12) 1,000 mcg, Daily RT    ECHINACEA ORAL Take by mouth.    GINSENG ORAL As needed    POTASSIUM CHLORIDE, BULK, MISC As needed    thiamine (VITAMIN B-1) 50 mg, Daily    TURMERIC ORAL As needed       PAT ROS:   Constitutional:   neg    Neuro/Psych:   neg    Eyes:    use of corrective lenses  Ears:   neg    Nose:   neg    Mouth:   neg    Throat:   neg    Neck:   neg    Cardio:   neg    Respiratory:   neg    Endocrine:   neg    GI:   neg    :   neg     no difficulty urinating  Musculoskeletal:   neg    Hematologic:   neg    Skin:  neg        Physical Exam  Vitals reviewed. Physical exam within normal limits.          PAT AIRWAY:   Airway:     Mallampati::  II    Neck ROM::  Full  normal        Visit Vitals  /85 Comment: rt arm   Pulse 91   Temp 36.9 °C (98.5 °F)   Resp 18   Ht 1.645 m (5' 4.75\")   Wt 49 kg (108 lb 0.4 oz)   SpO2 99%   BMI 18.12 kg/m²   Smoking Status Every Day   BSA 1.5 m²     Assessment and Plan:     Patient is a 64 year old male scheduled for left salivary gland excision with Dr. Ruiz on 1/23/25.    Patient is at acceptable risk to proceed with planned surgical procedure. Further cardiac risk stratification deferred at this time.This patient is LOW risk candidate undergoing MODERATE risk procedure, patient is medically optimized for surgery.    Plan      Cardiovascular:    RCRI: 0 Risk of Mace: 3.9%      Patient denies any chest pain, tightness, heaviness, pressure, radiating pain, palpitations, irregular heartbeats, lightheadedness, cough, congestion, shortness of breath, JOHNSON, PND, near syncope, weight loss or gain.    Good " functional capacity  Functional 4 Mets. Patient denies SOB walking up 2 flights of stairs    EKG in PAT not indicated.      Pulmonary:  No pulmonary diagnosis, however patient is at increased risk of perioperative complications secondary to  age > 60  Stop Bang score is 2 placing patient at LOW risk for HERLINDA  ARISCAT: <26 points, 1.6% risk of in-hospital postoperative pulmonary complication  PRODIGY: High risk for opioid induced respiratory depression    Patient has history of nicotine dependency. Smoking cessation education was provided to the patient.Cessation encouraged. - Physiological and physical aspects of tobacco addiction as well as strategies for quitting were discussed. - Counseling was given focusing on the harmful effects of this addiction especially given the patient's medical condition(s) which will be worsened because of the chemicals in tobacco      Renal/endo:    Recommendations to avoid nephrotoxic drugs and carefully monitor fluid status to maintain euvolemia. Use dose adjusted medications as needed for the underlying level of renal function.    Heme:  Patient instructed to ambulate as soon as possible postoperatively to decrease thromboembolic risk.    Initiate mechanical DVT prophylaxis as soon as possible and initiate chemical prophylaxis when deemed safe from a bleeding standpoint post surgery.        Caprini= 4      Risk assessment complete.  Patient is scheduled for a INTERMEDIATE surgical risk procedure.  He IS considered medically optimized for the planned procedure.        Labs/testing obtained in PAT on 1/14/25: CBC,  BMP    Lab Results   Component Value Date    WBC 4.5 01/14/2025    HGB 13.0 (L) 01/14/2025    HCT 39.5 (L) 01/14/2025    MCV 97 01/14/2025     (L) 01/14/2025     Lab Results   Component Value Date    GLUCOSE 86 01/14/2025    CALCIUM 10.9 (H) 01/14/2025     01/14/2025    K 4.3 01/14/2025    CO2 27 01/14/2025     01/14/2025    BUN 7 01/14/2025    CREATININE  0.84 01/14/2025       Follow up/communication: none      Preoperative medication instructions were provided and reviewed with the patient.  Any additional testing or evaluation was explained to the patient.  Nothing by mouth instructions were discussed and patient's questions were answered prior to conclusion to this encounter.  Patient verbalized understanding of preoperative instructions given in preadmission testing; discharge instructions available in EMR.    This note was dictated with speech recognition.  Minor errors may have been detected during use of speech recognition.

## 2025-01-14 NOTE — PREPROCEDURE INSTRUCTIONS
Medication List            Accurate as of January 14, 2025 10:08 AM. Always use your most recent med list.                ALLERGY RELIEF (CETIRIZINE) ORAL  Medication Adjustments for Surgery: Do Not take on the morning of surgery     ASCORBIC ACID (VITAMIN C) ORAL  Additional Medication Adjustments for Surgery: Take last dose 7 days before surgery     b complex 0.4 mg tablet  Additional Medication Adjustments for Surgery: Take last dose 7 days before surgery     B complex-vitamin C-folic acid 1- mg-mg-mcg tablet  Commonly known as: Nephro-Delia Rx  Additional Medication Adjustments for Surgery: Take last dose 7 days before surgery     CALCIUM-MAGNESIUM-ZINC ORAL  Additional Medication Adjustments for Surgery: Take last dose 7 days before surgery     cyanocobalamin 1,000 mcg tablet  Commonly known as: Vitamin B-12  Additional Medication Adjustments for Surgery: Take last dose 7 days before surgery     GINSENG ORAL  Additional Medication Adjustments for Surgery: Take last dose 7 days before surgery     POTASSIUM CHLORIDE (BULK) MISC  Additional Medication Adjustments for Surgery: Take last dose 7 days before surgery     thiamine 50 mg tablet  Commonly known as: Vitamin B-1  Additional Medication Adjustments for Surgery: Take last dose 7 days before surgery     TURMERIC ORAL  Additional Medication Adjustments for Surgery: Take last dose 7 days before surgery     Vitamin D3 50 MCG (2000 UT) tablet  Generic drug: cholecalciferol  Additional Medication Adjustments for Surgery: Take last dose 7 days before surgery                Preoperative Brain Exercises    What are brain exercises?  A brain exercise is any activity that engages your thinking (cognitive) skills.    What types of activities are considered brain exercises?  Jigsaw puzzles, crossword puzzles, word jumble, memory games, word search, and many more.  Many can be found free online or on your phone via a mobile rivka.    Why should I do brain exercises  before my surgery?  More recent research has shown brain exercise before surgery can lower the risk of postoperative delirium (confusion) which can be especially important for older adults.  Patients who did brain exercises for 5 to 10 hours the days before surgery, cut their risk of postoperative delirium in half up to 1 week after surgery.        Preoperative Deep Breathing Exercises  Why it is important to do deep breathing exercises before my surgery?  Deep breathing exercises strengthen your breathing muscles.  This helps you to recover after your surgery and decreases the chance of breathing complications.  How are the deep breathing exercises done?  Sit straight with your back supported.  Breathe in deeply and slowly through your nose. Your lower rib cage should expand and your abdomen may move forward.  Hold that breath for 3 to 5 seconds.  Breathe out through pursed lips, slowly and completely.  Rest and repeat 10 times every hour while awake.  Rest longer if you become dizzy or lightheaded.        CONTACT SURGEON'S OFFICE IF YOU DEVELOP:  * Fever = 100.4 F   * New respiratory symptoms (e.g. cough, shortness of breath, respiratory distress, sore throat)  * Recent loss of taste or smell  *Flu like symptoms such as headache, fatigue or gastrointestinal symptoms  * You develop any open sores, shingles, burning or painful urination   AND/OR:  * You no longer wish to have the surgery.  * Any other personal circumstances change that may lead to the need to cancel or defer this surgery.  *You were admitted to any hospital within one week of your planned procedure.    SMOKING:  *Quitting smoking can make a huge difference to your health and recovery from surgery.    *If you need help with quitting, call 7-800QUIT-NOW.    THE DAY OF SURGERY:  *Do not eat any food after midnight the night before your surgery.   *YOU MUST drink 14 OUNCES of clear liquids TWO hours before your instructed ARRIVAL TIME to the hospital.  This includes water, black tea/coffee (no milk or cream), apple juice, clear broth and electrolyte drinks (Gatorade).  Please avoid clear liquids that are red in color.   *You may chew gum/mints up to TWO hours before your surgery/procedure.    SURGICAL TIME:  *You will be contacted between 2 p.m. and 6 p.m. the business day before your surgery with your arrival time.  *If you haven't received a call by 6pm, call [phone].  *Scheduled surgery times may change and you will be notified if this occurs-check your personal voicemail for any updates.    ON THE MORNING OF SURGERY:  *Wear comfortable, loose fitting clothing.   *Do not use moisturizers, creams, lotions or perfume.  *All jewelry and valuables should be left at home.  *Prosthetic devices such as contact lenses, hearing aids, dentures, eyelash extensions, hairpins and body piercing must be removed before surgery.    BRING WITH YOU:  *Photo ID and insurance card  *Current list of medications and allergies  *Pacemaker/Defibrillator/Heart stent cards  *CPAP machine and mask  *Slings/splints/crutches  *Copy of your complete Advanced Directive/DHPOA-if applicable  *Neurostimulator implant remote    PARKING AND ARRIVAL:  *Check in at the Main Entrance desk and let them know you are here for surgery.  *You will be directed to the 2nd floor surgical waiting area.    IF YOU ARE HAVING OUTPATIENT/SAME DAY SURGERY:  *A responsible adult MUST accompany you at the time of discharge and stay with you for 24 hours after your surgery.  *You may NOT drive yourself home after surgery.  *You may use a taxi or ride sharing service ([employer], Uber) to return home ONLY if you are accompanied by a friend or family member.  *Instructions for resuming your medications will be provided by your surgeon.

## 2025-01-14 NOTE — CPM/PAT H&P
[facility]/PAT Evaluation       Name: Grabiel Duval (Grabiel Duval)  /Age: 1960/64 y.o.     In-Person         Date of Consult: 25    Referring Provider:  Dr. Ruiz    Date, Surgery, and Length:  25, left salivary gland excision, 120 minutes    Patient presents to [facility] for perioperative risk assessment prior to scheduled surgery. Patient presents with pleomorphic adenoma of left submandibular gland. He would like to proceed with removal.      This note was created in part upon personal review of patient's medical records.      Pt denies any past history of anesthetic complications such as PONV, awareness, prolonged sedation, dental damage, aspiration, cardiac arrest, difficult intubation, difficult I.V. access or unexpected hospital admissions. No history of malignant hyperthermia and or pseudocholinesterase deficiency.    No history of blood transfusions.    The patient IS NOT a [employer] and will accept blood and blood products if medically indicated.     Type and screen NOT sent.      Past Medical History:   Diagnosis Date    Cervical radiculopathy     Mass of left submandibular region     Right orbital fracture (Multi)         Right wrist fracture          No past surgical history on file.    Family History   Problem Relation Name Age of Onset    Cancer Father      Liver cancer Paternal Grandfather       Social History     Tobacco Use   Smoking Status Every Day    Current packs/day: 0.50    Types: Cigarettes    Passive exposure: Never   Smokeless Tobacco Never     Quit smoking 3 weeks ago     Social History     Substance and Sexual Activity   Alcohol Use Yes    Alcohol/week: 1.0 - 2.0 standard drink of alcohol    Types: 1 - 2 Glasses of wine per week    Comment: every other day     Social History     Substance and Sexual Activity   Drug Use Never       No Known Allergies    Current Outpatient Medications   Medication Instructions    ASCORBIC ACID, VITAMIN C, ORAL Take by  "mouth.    b complex 0.4 mg tablet 1 tablet, Daily    B complex-vitamin C-folic acid (Nephro-Delia Rx) 1- mg-mg-mcg tablet 1 tablet, Daily with breakfast    CALCIUM-MAGNESIUM-ZINC ORAL Take by mouth.    cetirizine HCl (ALLERGY RELIEF, CETIRIZINE, ORAL) As needed    cholecalciferol (Vitamin D3) 50 MCG (2000 UT) tablet Take by mouth.    cyanocobalamin (VITAMIN B-12) 1,000 mcg, Daily RT    ECHINACEA ORAL Take by mouth.    GINSENG ORAL As needed    POTASSIUM CHLORIDE, BULK, MISC As needed    thiamine (VITAMIN B-1) 50 mg, Daily    TURMERIC ORAL As needed       PAT ROS:   Constitutional:   neg    Neuro/Psych:   neg    Eyes:    use of corrective lenses  Ears:   neg    Nose:   neg    Mouth:   neg    Throat:   neg    Neck:   neg    Cardio:   neg    Respiratory:   neg    Endocrine:   neg    GI:   neg    :   neg     no difficulty urinating  Musculoskeletal:   neg    Hematologic:   neg    Skin:  neg        Physical Exam  Vitals reviewed. Physical exam within normal limits.          PAT AIRWAY:   Airway:     Mallampati::  II    Neck ROM::  Full  normal        Visit Vitals  /85 Comment: rt arm   Pulse 91   Temp 36.9 °C (98.5 °F)   Resp 18   Ht 1.645 m (5' 4.75\")   Wt 49 kg (108 lb 0.4 oz)   SpO2 99%   BMI 18.12 kg/m²   Smoking Status Every Day   BSA 1.5 m²     Assessment and Plan:     Patient is a 64 year old male scheduled for left salivary gland excision with Dr. Ruiz on 1/23/25.    Patient is at acceptable risk to proceed with planned surgical procedure. Further cardiac risk stratification deferred at this time.This patient is LOW risk candidate undergoing MODERATE risk procedure, patient is medically optimized for surgery.    Plan      Cardiovascular:    RCRI: 0 Risk of Mace: 3.9%      Patient denies any chest pain, tightness, heaviness, pressure, radiating pain, palpitations, irregular heartbeats, lightheadedness, cough, congestion, shortness of breath, JOHNSON, PND, near syncope, weight loss or gain.    Good " functional capacity  Functional 4 Mets. Patient denies SOB walking up 2 flights of stairs    EKG in PAT not indicated.      Pulmonary:  No pulmonary diagnosis, however patient is at increased risk of perioperative complications secondary to  age > 60  Stop Bang score is 2 placing patient at LOW risk for HERLINDA  ARISCAT: <26 points, 1.6% risk of in-hospital postoperative pulmonary complication  PRODIGY: High risk for opioid induced respiratory depression    Patient has history of nicotine dependency. Smoking cessation education was provided to the patient.Cessation encouraged. - Physiological and physical aspects of tobacco addiction as well as strategies for quitting were discussed. - Counseling was given focusing on the harmful effects of this addiction especially given the patient's medical condition(s) which will be worsened because of the chemicals in tobacco      Renal/endo:    Recommendations to avoid nephrotoxic drugs and carefully monitor fluid status to maintain euvolemia. Use dose adjusted medications as needed for the underlying level of renal function.    Heme:  Patient instructed to ambulate as soon as possible postoperatively to decrease thromboembolic risk.    Initiate mechanical DVT prophylaxis as soon as possible and initiate chemical prophylaxis when deemed safe from a bleeding standpoint post surgery.        Caprini= 4      Risk assessment complete.  Patient is scheduled for a INTERMEDIATE surgical risk procedure.  He IS considered medically optimized for the planned procedure.        Labs/testing obtained in PAT on 1/14/25: CBC,  BMP    Lab Results   Component Value Date    WBC 4.5 01/14/2025    HGB 13.0 (L) 01/14/2025    HCT 39.5 (L) 01/14/2025    MCV 97 01/14/2025     (L) 01/14/2025     Lab Results   Component Value Date    GLUCOSE 86 01/14/2025    CALCIUM 10.9 (H) 01/14/2025     01/14/2025    K 4.3 01/14/2025    CO2 27 01/14/2025     01/14/2025    BUN 7 01/14/2025    CREATININE  0.84 01/14/2025       Follow up/communication: none      Preoperative medication instructions were provided and reviewed with the patient.  Any additional testing or evaluation was explained to the patient.  Nothing by mouth instructions were discussed and patient's questions were answered prior to conclusion to this encounter.  Patient verbalized understanding of preoperative instructions given in preadmission testing; discharge instructions available in EMR.    This note was dictated with speech recognition.  Minor errors may have been detected during use of speech recognition.

## 2025-01-23 ENCOUNTER — HOSPITAL ENCOUNTER (OUTPATIENT)
Facility: HOSPITAL | Age: 65
Setting detail: OUTPATIENT SURGERY
Discharge: HOME | End: 2025-01-23
Attending: OTOLARYNGOLOGY | Admitting: OTOLARYNGOLOGY
Payer: COMMERCIAL

## 2025-01-23 ENCOUNTER — ANESTHESIA EVENT (OUTPATIENT)
Dept: OPERATING ROOM | Facility: HOSPITAL | Age: 65
End: 2025-01-23
Payer: COMMERCIAL

## 2025-01-23 ENCOUNTER — ANESTHESIA (OUTPATIENT)
Dept: OPERATING ROOM | Facility: HOSPITAL | Age: 65
End: 2025-01-23
Payer: COMMERCIAL

## 2025-01-23 VITALS
SYSTOLIC BLOOD PRESSURE: 120 MMHG | HEIGHT: 66 IN | WEIGHT: 107.14 LBS | BODY MASS INDEX: 17.22 KG/M2 | OXYGEN SATURATION: 98 % | DIASTOLIC BLOOD PRESSURE: 66 MMHG | HEART RATE: 86 BPM | TEMPERATURE: 98.6 F | RESPIRATION RATE: 14 BRPM

## 2025-01-23 DIAGNOSIS — R22.1 NECK MASS: ICD-10-CM

## 2025-01-23 DIAGNOSIS — G89.18 POST-OP PAIN: Primary | ICD-10-CM

## 2025-01-23 PROCEDURE — 2500000004 HC RX 250 GENERAL PHARMACY W/ HCPCS (ALT 636 FOR OP/ED)

## 2025-01-23 PROCEDURE — 2720000007 HC OR 272 NO HCPCS: Performed by: OTOLARYNGOLOGY

## 2025-01-23 PROCEDURE — 3600000008 HC OR TIME - EACH INCREMENTAL 1 MINUTE - PROCEDURE LEVEL THREE: Performed by: OTOLARYNGOLOGY

## 2025-01-23 PROCEDURE — 3600000003 HC OR TIME - INITIAL BASE CHARGE - PROCEDURE LEVEL THREE: Performed by: OTOLARYNGOLOGY

## 2025-01-23 PROCEDURE — 88307 TISSUE EXAM BY PATHOLOGIST: CPT | Mod: TC,AHULAB,WESLAB | Performed by: OTOLARYNGOLOGY

## 2025-01-23 PROCEDURE — 2500000004 HC RX 250 GENERAL PHARMACY W/ HCPCS (ALT 636 FOR OP/ED): Performed by: OTOLARYNGOLOGY

## 2025-01-23 PROCEDURE — 7100000002 HC RECOVERY ROOM TIME - EACH INCREMENTAL 1 MINUTE: Performed by: OTOLARYNGOLOGY

## 2025-01-23 PROCEDURE — 7100000001 HC RECOVERY ROOM TIME - INITIAL BASE CHARGE: Performed by: OTOLARYNGOLOGY

## 2025-01-23 PROCEDURE — 7100000010 HC PHASE TWO TIME - EACH INCREMENTAL 1 MINUTE: Performed by: OTOLARYNGOLOGY

## 2025-01-23 PROCEDURE — 3700000001 HC GENERAL ANESTHESIA TIME - INITIAL BASE CHARGE: Performed by: OTOLARYNGOLOGY

## 2025-01-23 PROCEDURE — 2500000001 HC RX 250 WO HCPCS SELF ADMINISTERED DRUGS (ALT 637 FOR MEDICARE OP)

## 2025-01-23 PROCEDURE — 2500000005 HC RX 250 GENERAL PHARMACY W/O HCPCS: Performed by: ANESTHESIOLOGY

## 2025-01-23 PROCEDURE — 42440 EXCISE SUBMAXILLARY GLAND: CPT | Performed by: OTOLARYNGOLOGY

## 2025-01-23 PROCEDURE — 2500000005 HC RX 250 GENERAL PHARMACY W/O HCPCS: Performed by: OTOLARYNGOLOGY

## 2025-01-23 PROCEDURE — 2500000004 HC RX 250 GENERAL PHARMACY W/ HCPCS (ALT 636 FOR OP/ED): Performed by: ANESTHESIOLOGIST ASSISTANT

## 2025-01-23 PROCEDURE — 88307 TISSUE EXAM BY PATHOLOGIST: CPT | Performed by: STUDENT IN AN ORGANIZED HEALTH CARE EDUCATION/TRAINING PROGRAM

## 2025-01-23 PROCEDURE — 7100000009 HC PHASE TWO TIME - INITIAL BASE CHARGE: Performed by: OTOLARYNGOLOGY

## 2025-01-23 PROCEDURE — 3700000002 HC GENERAL ANESTHESIA TIME - EACH INCREMENTAL 1 MINUTE: Performed by: OTOLARYNGOLOGY

## 2025-01-23 RX ORDER — MIDAZOLAM HYDROCHLORIDE 1 MG/ML
INJECTION INTRAMUSCULAR; INTRAVENOUS AS NEEDED
Status: DISCONTINUED | OUTPATIENT
Start: 2025-01-23 | End: 2025-01-23

## 2025-01-23 RX ORDER — PHENYLEPHRINE HCL IN 0.9% NACL 1 MG/10 ML
SYRINGE (ML) INTRAVENOUS AS NEEDED
Status: DISCONTINUED | OUTPATIENT
Start: 2025-01-23 | End: 2025-01-23

## 2025-01-23 RX ORDER — REMIFENTANIL HYDROCHLORIDE 1 MG/ML
INJECTION, POWDER, LYOPHILIZED, FOR SOLUTION INTRAVENOUS AS NEEDED
Status: DISCONTINUED | OUTPATIENT
Start: 2025-01-23 | End: 2025-01-23

## 2025-01-23 RX ORDER — ONDANSETRON HYDROCHLORIDE 2 MG/ML
INJECTION, SOLUTION INTRAVENOUS AS NEEDED
Status: DISCONTINUED | OUTPATIENT
Start: 2025-01-23 | End: 2025-01-23

## 2025-01-23 RX ORDER — LIDOCAINE HYDROCHLORIDE 20 MG/ML
INJECTION, SOLUTION EPIDURAL; INFILTRATION; INTRACAUDAL; PERINEURAL AS NEEDED
Status: DISCONTINUED | OUTPATIENT
Start: 2025-01-23 | End: 2025-01-23

## 2025-01-23 RX ORDER — TRAMADOL HYDROCHLORIDE 50 MG/1
50 TABLET ORAL EVERY 6 HOURS PRN
Qty: 12 TABLET | Refills: 0 | Status: SHIPPED | OUTPATIENT
Start: 2025-01-23 | End: 2025-01-26

## 2025-01-23 RX ORDER — SODIUM CHLORIDE, SODIUM LACTATE, POTASSIUM CHLORIDE, CALCIUM CHLORIDE 600; 310; 30; 20 MG/100ML; MG/100ML; MG/100ML; MG/100ML
100 INJECTION, SOLUTION INTRAVENOUS CONTINUOUS
Status: DISCONTINUED | OUTPATIENT
Start: 2025-01-23 | End: 2025-01-23 | Stop reason: HOSPADM

## 2025-01-23 RX ORDER — CEFAZOLIN 1 G/1
INJECTION, POWDER, FOR SOLUTION INTRAVENOUS AS NEEDED
Status: DISCONTINUED | OUTPATIENT
Start: 2025-01-23 | End: 2025-01-23

## 2025-01-23 RX ORDER — FENTANYL CITRATE 50 UG/ML
INJECTION, SOLUTION INTRAMUSCULAR; INTRAVENOUS AS NEEDED
Status: DISCONTINUED | OUTPATIENT
Start: 2025-01-23 | End: 2025-01-23

## 2025-01-23 RX ORDER — SODIUM CHLORIDE 0.9 G/100ML
INJECTION, SOLUTION IRRIGATION AS NEEDED
Status: DISCONTINUED | OUTPATIENT
Start: 2025-01-23 | End: 2025-01-23 | Stop reason: HOSPADM

## 2025-01-23 RX ORDER — LIDOCAINE HYDROCHLORIDE 10 MG/ML
0.1 INJECTION, SOLUTION EPIDURAL; INFILTRATION; INTRACAUDAL; PERINEURAL ONCE
Status: DISCONTINUED | OUTPATIENT
Start: 2025-01-23 | End: 2025-01-23 | Stop reason: HOSPADM

## 2025-01-23 RX ORDER — SUCCINYLCHOLINE CHLORIDE 20 MG/ML
INJECTION INTRAMUSCULAR; INTRAVENOUS AS NEEDED
Status: DISCONTINUED | OUTPATIENT
Start: 2025-01-23 | End: 2025-01-23

## 2025-01-23 RX ORDER — LIDOCAINE HYDROCHLORIDE AND EPINEPHRINE 10; 10 UG/ML; MG/ML
INJECTION, SOLUTION INFILTRATION; PERINEURAL AS NEEDED
Status: DISCONTINUED | OUTPATIENT
Start: 2025-01-23 | End: 2025-01-23 | Stop reason: HOSPADM

## 2025-01-23 RX ORDER — OXYCODONE HYDROCHLORIDE 5 MG/1
5 TABLET ORAL EVERY 4 HOURS PRN
Status: DISCONTINUED | OUTPATIENT
Start: 2025-01-23 | End: 2025-01-23 | Stop reason: HOSPADM

## 2025-01-23 RX ORDER — PROPOFOL 10 MG/ML
INJECTION, EMULSION INTRAVENOUS AS NEEDED
Status: DISCONTINUED | OUTPATIENT
Start: 2025-01-23 | End: 2025-01-23

## 2025-01-23 RX ORDER — DEXMEDETOMIDINE IN 0.9 % NACL 20 MCG/5ML
SYRINGE (ML) INTRAVENOUS AS NEEDED
Status: DISCONTINUED | OUTPATIENT
Start: 2025-01-23 | End: 2025-01-23

## 2025-01-23 RX ORDER — ONDANSETRON HYDROCHLORIDE 2 MG/ML
4 INJECTION, SOLUTION INTRAVENOUS ONCE AS NEEDED
Status: DISCONTINUED | OUTPATIENT
Start: 2025-01-23 | End: 2025-01-23 | Stop reason: HOSPADM

## 2025-01-23 RX ORDER — ALBUTEROL SULFATE 90 UG/1
INHALANT RESPIRATORY (INHALATION) AS NEEDED
Status: DISCONTINUED | OUTPATIENT
Start: 2025-01-23 | End: 2025-01-23

## 2025-01-23 RX ADMIN — PROPOFOL 180 MG: 10 INJECTION, EMULSION INTRAVENOUS at 10:55

## 2025-01-23 RX ADMIN — Medication 12 MCG: at 10:58

## 2025-01-23 RX ADMIN — CEFAZOLIN 2 G: 330 INJECTION, POWDER, FOR SOLUTION INTRAMUSCULAR; INTRAVENOUS at 10:58

## 2025-01-23 RX ADMIN — Medication 8 MCG: at 11:00

## 2025-01-23 RX ADMIN — PROPOFOL 20 MG: 10 INJECTION, EMULSION INTRAVENOUS at 11:04

## 2025-01-23 RX ADMIN — ONDANSETRON 4 MG: 2 INJECTION INTRAMUSCULAR; INTRAVENOUS at 11:00

## 2025-01-23 RX ADMIN — Medication 6 L/MIN: at 12:40

## 2025-01-23 RX ADMIN — PROPOFOL 30 MG: 10 INJECTION, EMULSION INTRAVENOUS at 11:20

## 2025-01-23 RX ADMIN — FENTANYL CITRATE 50 MCG: 50 INJECTION, SOLUTION INTRAMUSCULAR; INTRAVENOUS at 12:34

## 2025-01-23 RX ADMIN — Medication 0.1 MCG/KG/MIN: at 11:04

## 2025-01-23 RX ADMIN — SUCCINYLCHOLINE CHLORIDE 120 MG: 20 INJECTION, SOLUTION INTRAMUSCULAR; INTRAVENOUS at 10:55

## 2025-01-23 RX ADMIN — FENTANYL CITRATE 50 MCG: 50 INJECTION, SOLUTION INTRAMUSCULAR; INTRAVENOUS at 11:20

## 2025-01-23 RX ADMIN — LIDOCAINE HYDROCHLORIDE 100 MG: 20 INJECTION, SOLUTION EPIDURAL; INFILTRATION; INTRACAUDAL; PERINEURAL at 10:55

## 2025-01-23 RX ADMIN — Medication 100 MCG: at 11:45

## 2025-01-23 RX ADMIN — MIDAZOLAM HYDROCHLORIDE 2 MG: 1 INJECTION, SOLUTION INTRAMUSCULAR; INTRAVENOUS at 10:46

## 2025-01-23 RX ADMIN — ALBUTEROL SULFATE 6 PUFF: 90 AEROSOL, METERED RESPIRATORY (INHALATION) at 12:29

## 2025-01-23 RX ADMIN — FENTANYL CITRATE 100 MCG: 50 INJECTION, SOLUTION INTRAMUSCULAR; INTRAVENOUS at 10:55

## 2025-01-23 RX ADMIN — SODIUM CHLORIDE, POTASSIUM CHLORIDE, SODIUM LACTATE AND CALCIUM CHLORIDE: 600; 310; 30; 20 INJECTION, SOLUTION INTRAVENOUS at 10:46

## 2025-01-23 RX ADMIN — DEXAMETHASONE SODIUM PHOSPHATE 6 MG: 4 INJECTION, SOLUTION INTRA-ARTICULAR; INTRALESIONAL; INTRAMUSCULAR; INTRAVENOUS; SOFT TISSUE at 10:58

## 2025-01-23 SDOH — HEALTH STABILITY: MENTAL HEALTH: CURRENT SMOKER: 0

## 2025-01-23 ASSESSMENT — PAIN - FUNCTIONAL ASSESSMENT
PAIN_FUNCTIONAL_ASSESSMENT: 0-10
PAIN_FUNCTIONAL_ASSESSMENT: 0-10
PAIN_FUNCTIONAL_ASSESSMENT: UNABLE TO SELF-REPORT
PAIN_FUNCTIONAL_ASSESSMENT: 0-10
PAIN_FUNCTIONAL_ASSESSMENT: 0-10

## 2025-01-23 ASSESSMENT — PAIN SCALES - GENERAL
PAINLEVEL_OUTOF10: 0 - NO PAIN
PAIN_LEVEL: 4

## 2025-01-23 ASSESSMENT — COLUMBIA-SUICIDE SEVERITY RATING SCALE - C-SSRS
6. HAVE YOU EVER DONE ANYTHING, STARTED TO DO ANYTHING, OR PREPARED TO DO ANYTHING TO END YOUR LIFE?: NO
2. HAVE YOU ACTUALLY HAD ANY THOUGHTS OF KILLING YOURSELF?: NO
1. IN THE PAST MONTH, HAVE YOU WISHED YOU WERE DEAD OR WISHED YOU COULD GO TO SLEEP AND NOT WAKE UP?: NO

## 2025-01-23 NOTE — ANESTHESIA PROCEDURE NOTES
Airway  Date/Time: 1/23/2025 10:57 AM  Urgency: elective    Airway not difficult    Staffing  Performed: SRNA and CRNA   Authorized by: Jasen Pimentel MD    Performed by: Kameron Foster  Patient location during procedure: OR    Indications and Patient Condition  Indications for airway management: anesthesia and airway protection  Spontaneous Ventilation: absent  Sedation level: deep  Preoxygenated: yes  Patient position: sniffing  Mask difficulty assessment: 1 - vent by mask    Final Airway Details  Final airway type: endotracheal airway      Successful airway: ETT     Successful intubation technique: video laryngoscopy  Facilitating devices/methods: intubating stylet  Endotracheal tube insertion site: oral  Blade: Yanet  Blade size: #4  ETT size (mm): 7.5  Cormack-Lehane Classification: grade I - full view of glottis  Placement verified by: chest auscultation and capnometry   Measured from: lips  ETT to lips (cm): 23  Number of attempts at approach: 1

## 2025-01-23 NOTE — OP NOTE
Left Salivary Gland Excision (L) Operative Note     Date: 2025  OR Location: [facility] A OR    Name: Grabiel Duval, : 1960, Age: 65 y.o., MRN: [id number], Sex: male    Diagnosis  Pre-op Diagnosis      * Neck mass [R22.1] Post-op Diagnosis     * Neck mass [R22.1]     Procedures  Left Salivary Gland Excision  94987 - CA EXCISION SUBMANDIBULAR SUBMAXILLARY GLAND      Surgeons      * Rowdy Ruiz - Primary    Resident/Fellow/Other Assistant:  Stacey Jackson MD    Staff:   Circulator: Julisa  Scrub Person: Myesha Leyva Person: Chriss Pelaez Scrub: Dyana Pelaez Circulator: Nina    Anesthesia Staff: Anesthesiologist: Jasen Pimentel MD  CRNA: OCHOA Murguia  C-AA: DEIDRA Lacey  SRNA: Kameron Foster    Procedure Summary  Anesthesia: General  ASA: II  Estimated Blood Loss: 2mL  Intra-op Medications: Administrations occurring from 1030 to 1230 on 25:  * No intraprocedure medications in log *    Specimen: left submandibular gland    Findings: large submandibular gland with dominant cystic mass, no scarring or concerns for suspicious lymph nodes.     Indications: Grabiel Duval is an 65 y.o. male who is having surgery for Neck mass [R22.1].     The patient was seen in the preoperative area. The risks, benefits, complications, treatment options, non-operative alternatives, expected recovery and outcomes were discussed with the patient. The possibilities of reaction to medication, pulmonary aspiration, injury to surrounding structures, bleeding, recurrent infection, the need for additional procedures, failure to diagnose a condition, and creating a complication requiring transfusion or operation were discussed with the patient. The patient concurred with the proposed plan, giving informed consent.  The site of surgery was properly noted/marked if necessary per policy. The patient has been actively warmed in preoperative area. Preoperative antibiotics have been ordered and  given within 1 hours of incision. Venous thrombosis prophylaxis have been ordered including bilateral sequential compression devices    Procedure Details:   The patient was brought to the operating room and placed on the operating table in supine position. Transorally intubated. An 4cm line extending anteriorly from the from the anterior border of the sternocleidomastoid muscle and parallel to the mandible was marked at 2 fingerbreadths below the angle of mandible in a pre-existing skin crease. Epinephrine 1:100,000 was injected in the incision line in the left upper neck and the area prepped with Betadine and draped in sterile fashion. The skin incision was extended through the platysma. The inferior boder of the submandibular gland was identified with blunt dissection. The fascia of the submandibular gland was elevated off of the gland from inferior to superior to lift the fascia so that the marginal branch of the facial nerve was elevated off the gland with the fascia and protected. The facial artery was identified and small branches to the gland were clipped preserving the facial artery. The gland was dissected off the mylohyoid muscle which was then retracted anteriorly allowing identification of the lingual nerve. The gland was dissected away from the lingual nerve and the duct was ligated. The specimen was then delivered from the operative field and sent to pathology. The gland appeared normal without any appreciable abnormalities. The surgical site was copiously irrigated with sterile saline and hemostasis obtained with bipolar cautery. Fibrillar was placed in the wound bed. The incision was closed using deep 3-0 Vicryl stitches to reapproximate the platysma and deep dermal tissue and a running 4-0 monocryl subcuticular in skin. The patient was then turned over to the care of the anesthesiology team, extubated uneventfully in the operating room and transferred to the post-anesthesia care unit.       Complications:  None; patient tolerated the procedure well.    Disposition: PACU - hemodynamically stable.  Condition: stable     Attending Attestation:     Rowdy Ruiz  Phone Number: [phone]

## 2025-01-23 NOTE — ANESTHESIA PREPROCEDURE EVALUATION
Patient: Grabiel Duval    Procedure Information       Date/Time: 01/23/25 1030    Procedure: Left Salivary Gland Excision (Left: Neck)    Location: [facility] / [facility]    Surgeons: Rowdy Ruiz MD            Relevant Problems   Neuro   (+) Cervical radiculopathy       Clinical information reviewed:   Tobacco  Allergies  Meds   Med Hx  Surg Hx   Fam Hx  Soc Hx        NPO Detail:  NPO/Void Status  Carbohydrate Drink Given Prior to Surgery? : N  Date of Last Liquid: 01/23/25  Time of Last Liquid: 0700  Date of Last Solid: 01/22/25  Time of Last Solid: 1430  Last Intake Type: Clear fluids  Time of Last Void: 0858         Physical Exam    Airway  Mallampati: I  TM distance: >3 FB  Neck ROM: full     Cardiovascular - normal exam     Dental - normal exam     Pulmonary - normal exam     Abdominal - normal exam         Anesthesia Plan    History of general anesthesia?: yes  History of complications of general anesthesia?: no    ASA 2     general     The patient is not a current smoker.  Patient was not previously instructed to abstain from smoking on day of procedure.  Patient did not smoke on day of procedure.    intravenous induction   Postoperative administration of opioids is intended.  Anesthetic plan and risks discussed with patient.  Use of blood products discussed with patient who.    Plan discussed with CRNA.

## 2025-01-23 NOTE — ANESTHESIA POSTPROCEDURE EVALUATION
Patient: Grabiel Duval    Procedure Summary       Date: 01/23/25 Room / Location: [facility] OR 09 / [facility]    Anesthesia Start: 1046 Anesthesia Stop:     Procedure: Left Salivary Gland Excision (Left: Neck) Diagnosis:       Neck mass      (Neck mass [R22.1])    Surgeons: Rowdy Ruiz MD Responsible Provider: Jasen Pimentel MD    Anesthesia Type: general ASA Status: 2            Anesthesia Type: general    Vitals Value Taken Time   /80 01/23/25 1331   Temp 36.2 °C (97.2 °F) 01/23/25 1240   Pulse 87 01/23/25 1344   Resp 15 01/23/25 1330   SpO2 96 % 01/23/25 1344   Vitals shown include unfiled device data.    Anesthesia Post Evaluation    Patient location during evaluation: PACU  Patient participation: complete - patient participated  Level of consciousness: awake and alert  Pain score: 4  Pain management: adequate  Airway patency: patent  Cardiovascular status: acceptable  Respiratory status: acceptable  Hydration status: acceptable  Postoperative Nausea and Vomiting: none    No notable events documented.

## 2025-01-23 NOTE — DISCHARGE INSTRUCTIONS
DISCHARGE INSTRUCTIONS      Physicians:               Attending: Sara    Treatment/wound care:   Keep area(s) clean and dry.   It is okay to shower 48 hours after time of surgery.  You have steri strips over your incision. Do not take these off, they will fall off on their own.  Once the bandage falls off: Cleanse twice daily with baby shampoo or mild soap and water. Apply petroleum jelly/vaseline to incision line twice daily until incision has healed.   Do not scrub wound(s), pat dry.    Do not submerge wound(s) in standing water until seen in followup (no tub bathing, swimming, or hot tubs).    Please visually inspect your wound(s) at least once daily.  If the wound(s) are in a difficult to see location, please use a mirror or have someone else assist with visual inspection.    Post op expectations: You will notice some pain and mild swelling over your surgical site. Please look out for excessive neck swelling, difficulty breathing or swallowing, fevers, chills, or any other signs of infection. If this happens please call your provider or go to the emergency room for evaluation if its urgent.    Pain Control:    Adequate management:   Tramadol can be taken as prescribed as needed for breakthrough pain.    Tramadol is a narcotic, which can induce constipation.   Please take stool softeners when taking this medication to prevent constipation.    Activity after Discharge:   No heavy lifting, weight bearing as tolerated, no driving until mobility is returned to normal.   Do not submerge wound(s) in standing water for 7 days after surgery (no tub bathing, swimming, or hot tubs).   Do not lift, push or pull more than 10 pounds or drive for 6 weeks and do not drive or operate heavy machinery while taking narcotic pain medications as these medications can alter perception, impair judgement, and slow reaction times.    Diet: regular    Follow-Up:   See below    Future Appointments   Date Time Provider Department Center    2/4/2025 12:30 PM Rowdy Ruiz MD [address]

## 2025-01-24 ASSESSMENT — PAIN SCALES - GENERAL: PAINLEVEL_OUTOF10: 0 - NO PAIN

## 2025-01-31 LAB
CLINICAL SIG UPDATED INFO: NORMAL
LABORATORY COMMENT REPORT: NORMAL
PATH REPORT.FINAL DX SPEC: NORMAL
PATH REPORT.GROSS SPEC: NORMAL
PATH REPORT.RELEVANT HX SPEC: NORMAL
PATH REPORT.TOTAL CANCER: NORMAL

## 2025-02-04 ENCOUNTER — APPOINTMENT (OUTPATIENT)
Dept: OTOLARYNGOLOGY | Facility: CLINIC | Age: 65
End: 2025-02-04
Payer: COMMERCIAL

## 2025-02-04 VITALS — BODY MASS INDEX: 18.64 KG/M2 | WEIGHT: 116 LBS | HEIGHT: 66 IN

## 2025-02-04 DIAGNOSIS — R22.0 MASS OF LEFT SUBMANDIBULAR REGION: Primary | ICD-10-CM

## 2025-02-04 PROCEDURE — 3008F BODY MASS INDEX DOCD: CPT | Performed by: OTOLARYNGOLOGY

## 2025-02-04 PROCEDURE — 1159F MED LIST DOCD IN RCRD: CPT | Performed by: OTOLARYNGOLOGY

## 2025-02-04 PROCEDURE — 99024 POSTOP FOLLOW-UP VISIT: CPT | Performed by: OTOLARYNGOLOGY

## 2025-02-04 PROCEDURE — 1160F RVW MEDS BY RX/DR IN RCRD: CPT | Performed by: OTOLARYNGOLOGY

## 2025-02-04 NOTE — LETTER
February 4, 2025     Patient: Grabiel Duval   YOB: 1960   Date of Visit: 2/4/2025       To Whom It May Concern:    It is my medical opinion that Grabiel Duval may return to work on 2/6/25 with no restrictions .    If you have any questions or concerns, please don't hesitate to call.         Sincerely,        Rowdy Ruiz MD    CC: No Recipients

## 2025-02-04 NOTE — PROGRESS NOTES
Left SMG removal 1/23/25    Pleomorphic adenoma, discussed with the pathologist    No pain    Swelling ok    Eating ok      PE:CN ok  Incision ok    No colleciton      Stable post op      Local care and self exams reviewed    Rare recurrence rates    Will call if any concerns arise in the area

## (undated) DEVICE — NEEDLE, HYPODERMIC, REGULAR WALL, REGULAR BEVEL, 27 G X 1.25 IN

## (undated) DEVICE — SUTURE, SILK, 2-0, FSL, BR, 30 IN, BLACK

## (undated) DEVICE — Device

## (undated) DEVICE — STAPLER, SKIN PROXIMATE, 35 WIDE

## (undated) DEVICE — CORD, CAUTERY, BIOPOLAR FORCEP, 12FT

## (undated) DEVICE — SPONGE, HEMOSTATIC, CELLULOSE, SURGICEL, FIBRILLAR, 2 X 4 IN

## (undated) DEVICE — SPONGE, DISSECTOR, PEANUT, 3/8, STERILE 5 FOAM HOLDER"

## (undated) DEVICE — STAY SET, SURGICAL, 5MM BLUNT HOOK, 8/PK

## (undated) DEVICE — DRAPE, INSTRUMENT, W/POUCH, STERI DRAPE, 9 5/8 X 18 LONG

## (undated) DEVICE — SUTURE, VICRYL PLUS 3-0, RB-1, 27IN

## (undated) DEVICE — LUBRICANT, ELECTROLUBE, F/ELECTRODE TIPS

## (undated) DEVICE — ELECTRODE, ELECTROSURGICAL, GUARDED TIP

## (undated) DEVICE — SUTURE, SILK, 2-0, 18 IN, BLACK

## (undated) DEVICE — DISSECTOR, EXACT, LIGASURE

## (undated) DEVICE — APPLIER,  LIGACLIP MULTI CLIP, 30 MED 11 1/2